# Patient Record
Sex: MALE | Race: BLACK OR AFRICAN AMERICAN | NOT HISPANIC OR LATINO | Employment: FULL TIME | URBAN - METROPOLITAN AREA
[De-identification: names, ages, dates, MRNs, and addresses within clinical notes are randomized per-mention and may not be internally consistent; named-entity substitution may affect disease eponyms.]

---

## 2017-10-31 ENCOUNTER — HOSPITAL ENCOUNTER (EMERGENCY)
Facility: HOSPITAL | Age: 37
Discharge: HOME | End: 2017-10-31
Admitting: EMERGENCY MEDICINE
Payer: COMMERCIAL

## 2017-10-31 ENCOUNTER — APPOINTMENT (EMERGENCY)
Dept: RADIOLOGY | Facility: HOSPITAL | Age: 37
End: 2017-10-31
Payer: COMMERCIAL

## 2017-10-31 VITALS
HEIGHT: 62 IN | WEIGHT: 150 LBS | BODY MASS INDEX: 27.6 KG/M2 | SYSTOLIC BLOOD PRESSURE: 149 MMHG | RESPIRATION RATE: 17 BRPM | OXYGEN SATURATION: 99 % | DIASTOLIC BLOOD PRESSURE: 89 MMHG | HEART RATE: 88 BPM | TEMPERATURE: 97 F

## 2017-10-31 DIAGNOSIS — S62.613A: Primary | ICD-10-CM

## 2017-10-31 PROCEDURE — 73130 X-RAY EXAM OF HAND: CPT

## 2017-10-31 PROCEDURE — 99283 EMERGENCY DEPT VISIT LOW MDM: CPT

## 2017-11-01 NOTE — ED PROVIDER NOTES
History  Chief Complaint   Patient presents with    Finger Injury     Pt c/o L middle finger injury while playing basketball on Saturday with increased pain until today      25-year-old male presents for left middle finger injury  He was playing basketball with his son on Saturday when he tried to steal the ball and jammed his left middle finger  He has pain since that time  Had bruising and ecchymosis which is improving at this time  He wanted to come to the ER that day but did not have a ride available  Today was the 1st time that he had a ride available  Pain is worse with movement  Better at rest   Swelling is improving at this time  Denies any numbness tingling or weakness  No prior injuries/fractures to this area  Otherwise making no complaints  He is right-hand dominant  History provided by:  Patient   used: No    Hand Injury   Location:  Finger  Finger location:  L middle finger  Injury: yes    Time since incident:  3 days  Mechanism of injury comment:  Jammed L middle finger into a basketball  Pain details:     Quality:  Aching    Radiates to:  Does not radiate    Severity:  Mild    Onset quality:  Gradual    Duration:  3 days    Timing:  Intermittent    Progression:  Improving  Handedness:  Right-handed  Dislocation: no    Foreign body present:  No foreign bodies  Tetanus status:  Unknown  Prior injury to area:  No  Relieved by:  Rest  Worsened by: Movement  Ineffective treatments:  None tried  Associated symptoms: muscle weakness    Associated symptoms: no back pain, no decreased range of motion, no fatigue, no fever, no neck pain, no numbness, no stiffness, no swelling and no tingling    Risk factors: no concern for non-accidental trauma, no known bone disorder, no frequent fractures and no recent illness        None       History reviewed  No pertinent past medical history      Past Surgical History:   Procedure Laterality Date    HERNIA REPAIR         History reviewed  No pertinent family history  I have reviewed and agree with the history as documented  Social History   Substance Use Topics    Smoking status: Never Smoker    Smokeless tobacco: Never Used    Alcohol use Yes      Comment: daily        Review of Systems   Constitutional: Negative  Negative for fatigue and fever  HENT: Negative for dental problem, ear pain, hearing loss, nosebleeds, tinnitus and trouble swallowing  Eyes: Negative for photophobia, pain and visual disturbance  Respiratory: Negative for apnea, cough, choking, chest tightness, shortness of breath, wheezing and stridor  Gastrointestinal: Negative for abdominal pain, nausea and vomiting  Genitourinary: Negative for decreased urine volume and enuresis  Musculoskeletal: Negative for back pain, myalgias, neck pain, neck stiffness and stiffness  Left middle finger injury   Skin: Negative for pallor, rash and wound  Allergic/Immunologic: Negative  Neurological: Negative for dizziness, syncope, speech difficulty, weakness, light-headedness, numbness and headaches  Physical Exam  ED Triage Vitals [10/31/17 2309]   Temperature Pulse Respirations Blood Pressure SpO2   (!) 97 °F (36 1 °C) 88 17 149/89 99 %      Temp Source Heart Rate Source Patient Position - Orthostatic VS BP Location FiO2 (%)   Temporal Monitor Sitting Right arm --      Pain Score       No Pain           Orthostatic Vital Signs  Vitals:    10/31/17 2309   BP: 149/89   Pulse: 88   Patient Position - Orthostatic VS: Sitting       Physical Exam   Constitutional: He is oriented to person, place, and time  He appears well-developed and well-nourished  Non-toxic appearance  He does not have a sickly appearance  He does not appear ill  No distress  HENT:   Head: Normocephalic and atraumatic  Eyes: EOM and lids are normal  Pupils are equal, round, and reactive to light  Neck: Normal range of motion  Neck supple     Cardiovascular: Normal rate, regular rhythm, S1 normal, S2 normal, normal heart sounds, intact distal pulses and normal pulses  Exam reveals no gallop, no distant heart sounds, no friction rub and no decreased pulses  No murmur heard  Pulses:       Radial pulses are 2+ on the right side, and 2+ on the left side  Pulmonary/Chest: Effort normal and breath sounds normal  No accessory muscle usage  No apnea, no tachypnea and no bradypnea  No respiratory distress  He has no decreased breath sounds  He has no wheezes  He has no rhonchi  He has no rales  Abdominal: Soft  Normal appearance and bowel sounds are normal  He exhibits no distension and no mass  There is no tenderness  There is no rigidity, no rebound and no guarding  No hernia  Musculoskeletal: Normal range of motion  He exhibits no edema or deformity  Left hand: He exhibits tenderness and bony tenderness  He exhibits normal range of motion, normal two-point discrimination, normal capillary refill, no deformity, no laceration and no swelling  Normal sensation noted  Normal strength noted  Radial ulnar and median nerve function intact to the left hand  Full range of motion of the wrist in all digits of the finger  Normal flexion and extension of the distal as well as proximal interphalangeal joints of the left middle finger  There is mild ecchymosis along the left middle finger  There is no swelling  He has normal sensation  Brisk capillary refill  Neurological: He is alert and oriented to person, place, and time  No cranial nerve deficit  GCS eye subscore is 4  GCS verbal subscore is 5  GCS motor subscore is 6  GCS 15  AAOx3  Ambulating in department without difficulty  CN II-XII grossly intact  No focal neuro deficits  Skin: Skin is warm, dry and intact  No rash noted  He is not diaphoretic  No erythema  No pallor  Psychiatric: His speech is normal    Nursing note and vitals reviewed        ED Medications  Medications - No data to display    Diagnostic Studies  Results Reviewed     None                 XR hand 3+ views LEFT   ED Interpretation by Steven Hernandez PA-C (10/31 4080)   fx proximal phalan middle finger                 Procedures  Procedures       Phone Contacts  ED Phone Contact    ED Course  ED Course                                MDM  Number of Diagnoses or Management Options  Fracture of proximal phalanx of left middle finger: new and requires workup  Diagnosis management comments: Differential diagnosis including but not limited to: sprain, strain, fracture, dislocation, contusion  Plan: XR  Analgesia offered which he has declined  dispo pending  Amount and/or Complexity of Data Reviewed  Tests in the radiology section of CPT®: ordered and reviewed  Independent visualization of images, tracings, or specimens: yes    Risk of Complications, Morbidity, and/or Mortality  Presenting problems: low  Management options: low  General comments: 54-year-old male with left middle finger injury  Found to have fracture of the proximal phalanx of these finger  Placed in a metal baseball splint  Instructed to follow up with orthopedic/hand surgeon  Continue rest ice compression elevation  Tylenol Motrin for pain  Return parameters provided  Patient understands agrees with the plan  Patient again declined analgesia medicine here  Patient Progress  Patient progress: stable    CritCare Time    Disposition  Final diagnoses:   Fracture of proximal phalanx of left middle finger     Time reflects when diagnosis was documented in both MDM as applicable and the Disposition within this note     Time User Action Codes Description Comment    10/31/2017 11:45 PM Julia Mascorro Add [[id number]] Fracture of proximal phalanx of left middle finger       ED Disposition     ED Disposition Condition Comment    Discharge  Dexter aBnda discharge to home/self care      Condition at discharge: Good        Follow-up Information     Follow up With Specialties Details Why Contact Info    [facility]  Call in 1 day for follow up for left middle finger fracture [facility]    Luke Sesay MD Orthopedic Surgery Call  [facility]  2nd [address]  [phone]          Patient's Medications    No medications on file     No discharge procedures on file      ED Provider  Electronically Signed by           Starr Karimi PA-C  10/31/17 6892

## 2017-11-01 NOTE — DISCHARGE INSTRUCTIONS
Return to the Emergency Department sooner if increased pain, swelling, numbness, weakness, vomiting, difficulty breathing, redness  Ice, elevate  Finger Fracture   WHAT YOU NEED TO KNOW:   A finger fracture is a break in 1 or more of the bones in your finger  DISCHARGE INSTRUCTIONS:   Return to the emergency department if:   · Your cast or splint gets wet, damaged, or comes off  · Your splint or cast feels too tight  · You have severe pain  · Your injured finger is numb, cold, or pale  Contact your healthcare provider or hand specialist if:   · Your pain or swelling gets worse, even after treatment  · You have questions or concerns about your condition or care  Medicines:   · NSAIDs , such as ibuprofen, help decrease swelling, pain, and fever  This medicine is available with or without a doctor's order  NSAIDs can cause stomach bleeding or kidney problems in certain people  If you take blood thinner medicine, always ask your healthcare provider if NSAIDs are safe for you  Always read the medicine label and follow directions  · Acetaminophen  decreases pain and fever  It is available without a doctor's order  Ask how much to take and how often to take it  Follow directions  Acetaminophen can cause liver damage if not taken correctly  · Prescription pain medicine  may be given  Ask your healthcare provider how to take this medicine safely  Some prescription pain medicines contain acetaminophen  Do not take other medicines that contain acetaminophen without talking to your healthcare provider  Too much acetaminophen may cause liver damage  Prescription pain medicine may cause constipation  Ask your healthcare provider how to prevent or treat constipation  · Take your medicine as directed  Contact your healthcare provider if you think your medicine is not helping or if you have side effects  Tell him or her if you are allergic to any medicine   Keep a list of the medicines, vitamins, and herbs you take  Include the amounts, and when and why you take them  Bring the list or the pill bottles to follow-up visits  Carry your medicine list with you in case of an emergency  Self-care:   · Wear your splint as directed  Do not remove your splint until you follow up with your healthcare provider or hand specialist      · Apply ice  on your finger for 15 to 20 minutes every hour or as directed  Use an ice pack, or put crushed ice in a plastic bag  Cover it with a towel before you apply it to your skin  Ice helps prevent tissue damage and decreases swelling and pain  · Elevate  your finger above the level of your heart as often as you can  This will help decrease swelling and pain  Prop your hand on pillows or blankets to keep it elevated comfortably  · Go to physical therapy as directed  A physical therapist teaches you exercises to help improve movement and strength, and to decrease pain  Follow up with your healthcare provider or hand specialist within 2 days:  Write down your questions so you remember to ask them during your visits  © 2017 [facility] Information is for End User's use only and may not be sold, redistributed or otherwise used for commercial purposes  All illustrations and images included in CareNotes® are the copyrighted property of [employer]  or Frankie Germain  The above information is an  only  It is not intended as medical advice for individual conditions or treatments  Talk to your doctor, nurse or pharmacist before following any medical regimen to see if it is safe and effective for you